# Patient Record
Sex: FEMALE | Race: WHITE | Employment: OTHER | URBAN - METROPOLITAN AREA
[De-identification: names, ages, dates, MRNs, and addresses within clinical notes are randomized per-mention and may not be internally consistent; named-entity substitution may affect disease eponyms.]

---

## 2024-05-14 ENCOUNTER — OFFICE VISIT (OUTPATIENT)
Dept: ORTHOPEDIC SURGERY | Age: 49
End: 2024-05-14
Payer: COMMERCIAL

## 2024-05-14 ENCOUNTER — TELEPHONE (OUTPATIENT)
Dept: ORTHOPEDIC SURGERY | Age: 49
End: 2024-05-14

## 2024-05-14 ENCOUNTER — PREP FOR PROCEDURE (OUTPATIENT)
Dept: ORTHOPEDIC SURGERY | Age: 49
End: 2024-05-14

## 2024-05-14 VITALS — TEMPERATURE: 97.9 F | HEART RATE: 78 BPM | OXYGEN SATURATION: 99 % | RESPIRATION RATE: 18 BRPM

## 2024-05-14 DIAGNOSIS — Z01.818 PREOPERATIVE TESTING: ICD-10-CM

## 2024-05-14 DIAGNOSIS — G56.03 BILATERAL CARPAL TUNNEL SYNDROME: Primary | ICD-10-CM

## 2024-05-14 DIAGNOSIS — M65.311 TRIGGER THUMB OF RIGHT HAND: ICD-10-CM

## 2024-05-14 DIAGNOSIS — M65.312 TRIGGER THUMB OF LEFT HAND: ICD-10-CM

## 2024-05-14 DIAGNOSIS — G56.02 LEFT CARPAL TUNNEL SYNDROME: ICD-10-CM

## 2024-05-14 DIAGNOSIS — G56.01 CARPAL TUNNEL SYNDROME, RIGHT: ICD-10-CM

## 2024-05-14 PROCEDURE — 99203 OFFICE O/P NEW LOW 30 MIN: CPT | Performed by: ORTHOPAEDIC SURGERY

## 2024-05-14 ASSESSMENT — ENCOUNTER SYMPTOMS
EYE PAIN: 0
COLOR CHANGE: 0
EYE REDNESS: 0
SHORTNESS OF BREATH: 0
CHEST TIGHTNESS: 0
VOMITING: 0
WHEEZING: 0

## 2024-05-14 NOTE — PATIENT INSTRUCTIONS
Continue weight-bearing as tolerated.  Continue range of motion exercises as instructed.  Ice and elevate as needed.  Tylenol or Motrin for pain.  We will schedule surgery at your convenience. If you have any questions regarding your surgery, please call our office and ask to speak with Caitlin [phone].

## 2024-05-14 NOTE — TELEPHONE ENCOUNTER
Scheduled patient for;    Left Carpal Tunnel Release and Left Thumb Trigger Finger Release  CPT: 07983; 33585  ICD 10: G56.02; M65.312  Surgery Date: 5/22/2024  Surgeon: Tre  Facility: [facility]  Anesthesia: MAC    Clearances sent to:  PCP: Apoorva Howard  Nephrology: Gal    Insurance: Aetna  Prior auth started on 5/14/2024 via [employer] online portal  No Prior Auth Required

## 2024-05-14 NOTE — PROGRESS NOTES
Patient seen in office today for: Left trigger thumb started 4 weeks, pain is located in left thumb to palm with tingling and numbness.     Patient reports 10/10 pain, especially at night   RICE and medication are effective to alleviate pain and reduce swelling.     Pain worsened by: Patient reports painful ROM & weight bearing.     Patient is interested in injection today, but open to surgical intervention.     Profession: Disabled     Right handed   
nerve distribution.  Sensation is intact to light touch along the ulnar and radial nerves.  Positive carpal compression test and Phalen's test.  There is mild relative weakness with 4+ strength out of 5 during  test, pinch test, and flexor pollicis brevis.    Skin is intact without wounds or rash.  2+ radial pulse with brisk cap refill throughout the fingers.  No atrophy of the thenar musculature. Strength 5/5 in finger and wrist flexion and extension.  Well-healed surgical scar over the ring finger metacarpal from previous trigger release    There is severe tenderness to palpation over the A1 pulley of the thumb.  There is mild swelling diffusely throughout the digit primarily along the course of the flexor tendon.  There is active triggering with range of motion of the thumb IP joint with very limited range of motion.  The thumb is held in an extended position and is unable to be flexed actively or passively due to pain and guarding.  There is pain at the volar aspect of the thumb with passive stretch extension across the IP joint of the thumb.      Right Upper Extremity:    There is moderate to severe tenderness to palpation over the A1 pulley of the thumb.  There is mild swelling diffusely throughout the digit primarily along the course of the flexor tendon.  There is active triggering with range of motion of the thumb IP joint.  There is pain at the volar aspect of the thumb with passive stretch extension across the IP joint of the thumb.  Active range of motion is mildly limited both in flexion and extension across the IP joint.  Sensation is intact to light touch throughout the hand in the median, ulnar, radial nerve distributions with mild decreased sensation along the thumb index and middle finger to light touch.  Mildly positive carpal compression test.  Skin is intact.  2+ radial pulse. Strength is 5/5 with thumb and finger flexion and extension of IP and MP joints throughout the hand         Skin:

## 2024-05-17 RX ORDER — MIRTAZAPINE 15 MG/1
TABLET, FILM COATED ORAL
COMMUNITY

## 2024-05-17 RX ORDER — SODIUM CHLORIDE 0.9 % (FLUSH) 0.9 %
5-40 SYRINGE (ML) INJECTION EVERY 12 HOURS SCHEDULED
Status: CANCELLED | OUTPATIENT
Start: 2024-05-17

## 2024-05-17 RX ORDER — GABAPENTIN 400 MG/1
CAPSULE ORAL
COMMUNITY

## 2024-05-17 RX ORDER — ESCITALOPRAM OXALATE 10 MG/1
TABLET ORAL
COMMUNITY

## 2024-05-17 RX ORDER — INSULIN HUMAN 500 [IU]/ML
INJECTION, SOLUTION SUBCUTANEOUS
COMMUNITY

## 2024-05-17 RX ORDER — DESVENLAFAXINE 100 MG/1
100 TABLET, EXTENDED RELEASE ORAL DAILY
COMMUNITY
Start: 2024-04-19

## 2024-05-17 RX ORDER — SODIUM CHLORIDE 9 MG/ML
INJECTION, SOLUTION INTRAVENOUS PRN
Status: CANCELLED | OUTPATIENT
Start: 2024-05-17

## 2024-05-17 RX ORDER — ONDANSETRON 4 MG/1
TABLET, FILM COATED ORAL
COMMUNITY
Start: 2024-05-03

## 2024-05-17 RX ORDER — SUMATRIPTAN 100 MG/1
TABLET, FILM COATED ORAL
COMMUNITY

## 2024-05-17 RX ORDER — CARVEDILOL 6.25 MG/1
6.25 TABLET ORAL 2 TIMES DAILY
COMMUNITY

## 2024-05-17 RX ORDER — FENOFIBRATE 200 MG/1
CAPSULE ORAL
COMMUNITY

## 2024-05-17 RX ORDER — OXCARBAZEPINE 300 MG/1
TABLET, FILM COATED ORAL
COMMUNITY

## 2024-05-17 RX ORDER — PRAZOSIN HYDROCHLORIDE 1 MG/1
1 CAPSULE ORAL NIGHTLY
COMMUNITY

## 2024-05-17 RX ORDER — OXYBUTYNIN CHLORIDE 10 MG/1
TABLET, EXTENDED RELEASE ORAL DAILY
COMMUNITY

## 2024-05-17 RX ORDER — POTASSIUM CITRATE 10 MEQ/1
10 TABLET, EXTENDED RELEASE ORAL 2 TIMES DAILY
COMMUNITY

## 2024-05-17 RX ORDER — IPRATROPIUM BROMIDE AND ALBUTEROL SULFATE 2.5; .5 MG/3ML; MG/3ML
3 SOLUTION RESPIRATORY (INHALATION)
COMMUNITY

## 2024-05-17 RX ORDER — ATORVASTATIN CALCIUM 20 MG/1
40 TABLET, FILM COATED ORAL DAILY
COMMUNITY

## 2024-05-17 RX ORDER — SODIUM CHLORIDE 0.9 % (FLUSH) 0.9 %
5-40 SYRINGE (ML) INJECTION PRN
Status: CANCELLED | OUTPATIENT
Start: 2024-05-17

## 2024-05-17 RX ORDER — OMEPRAZOLE 40 MG/1
40 CAPSULE, DELAYED RELEASE ORAL DAILY
COMMUNITY

## 2024-05-17 RX ORDER — DICYCLOMINE HCL 20 MG
20 TABLET ORAL EVERY 6 HOURS
COMMUNITY

## 2024-05-17 RX ORDER — HYDROXYZINE PAMOATE 50 MG/1
100 CAPSULE ORAL 3 TIMES DAILY PRN
COMMUNITY

## 2024-05-17 RX ORDER — FUROSEMIDE 20 MG/1
TABLET ORAL
COMMUNITY

## 2024-05-17 RX ORDER — GLIPIZIDE 10 MG/1
TABLET, FILM COATED, EXTENDED RELEASE ORAL
COMMUNITY
Start: 2024-04-19

## 2024-05-17 RX ORDER — INSULIN GLARGINE AND LIXISENATIDE 100; 33 U/ML; UG/ML
32 INJECTION, SOLUTION SUBCUTANEOUS
COMMUNITY
Start: 2024-04-04

## 2024-05-17 RX ORDER — EMPAGLIFLOZIN 25 MG/1
TABLET, FILM COATED ORAL
COMMUNITY

## 2024-05-17 RX ORDER — CLONAZEPAM 1 MG/1
TABLET ORAL
COMMUNITY

## 2024-05-17 NOTE — PROGRESS NOTES
.Surgery @ [facility] on 5/22/24 you will be called 5/21/24 with times    NOTHING TO EAT OR DRINK AFTER MIDNIGHT DAY OF SURGERY    1. Enter thru the hospital main entrance on day of surgery, check in at the Information Desk. If you arrive prior to 6:00am, enter thru the ER entrance.    2. Follow the directions as prescribed by the doctor for your procedure and medications.         Morning of surgery take: Coreg, Omeprazole & Bentyl with sips of water         Stop vitamins, supplements and NSAIDS: NOW         Hold Jardiance & Glipizide starting 5/21/24    3. Check with your Doctor regarding stopping blood thinners and follow their instructions.    4. Do not smoke, vape or use chewing tobacco morning of surgery. Do not drink any alcoholic beverages 24 hours prior to surgery.       This includes NA Beer. No street drugs 7 days prior to surgery.    5. If you have dentures, contacts of glasses they will be removed before going to the OR; please bring a case.    6. Please bring picture ID, insurance card, paperwork from the doctor’s office (H & P, Consent, & card for implantable devices).    7. Take a shower with an antibacterial soap the night before surgery and the morning of surgery. Do not put anything on your skin      After your morning shower.    8. You will need a responsible adult to drive you home and check on you after surgery.

## 2024-05-21 ENCOUNTER — ANESTHESIA EVENT (OUTPATIENT)
Dept: OPERATING ROOM | Age: 49
End: 2024-05-21
Payer: COMMERCIAL

## 2024-05-21 ASSESSMENT — LIFESTYLE VARIABLES: SMOKING_STATUS: 1

## 2024-05-21 NOTE — ANESTHESIA PRE PROCEDURE
\"HEPCAB\"    COVID-19 Screening (If Applicable): No results found for: \"COVID19\"        Anesthesia Evaluation  Patient summary reviewed  Airway: Mallampati: III  TM distance: >3 FB   Neck ROM: full  Mouth opening: > = 3 FB   Dental: normal exam         Pulmonary:   (+)       decreased breath sounds    asthma: current smoker                           Cardiovascular:                      Neuro/Psych:   (+) psychiatric history:            GI/Hepatic/Renal:   (+) GERD:, morbid obesity          Endo/Other:    (+) DiabetesType II DM, : arthritis:..                 Abdominal:   (+) obese          Vascular:          Other Findings:       Anesthesia Plan      MAC     ASA 3       Induction: intravenous.      Anesthetic plan and risks discussed with patient.      Plan discussed with CRNA.                DENISE Dallas - CRNA   5/21/2024

## 2024-05-21 NOTE — PROGRESS NOTES
Patient notified of surgery time at [facility] 5/22/2024 0730 arrival 0600, NPO instructions and DOS meds reviewed, understanding verbalized

## 2024-05-22 ENCOUNTER — ANESTHESIA (OUTPATIENT)
Dept: OPERATING ROOM | Age: 49
End: 2024-05-22
Payer: COMMERCIAL

## 2024-05-22 ENCOUNTER — HOSPITAL ENCOUNTER (OUTPATIENT)
Age: 49
Setting detail: OUTPATIENT SURGERY
Discharge: HOME | End: 2024-05-22
Attending: ORTHOPAEDIC SURGERY | Admitting: ORTHOPAEDIC SURGERY
Payer: COMMERCIAL

## 2024-05-22 VITALS
BODY MASS INDEX: 43.32 KG/M2 | TEMPERATURE: 97.9 F | HEART RATE: 99 BPM | WEIGHT: 276 LBS | RESPIRATION RATE: 18 BRPM | SYSTOLIC BLOOD PRESSURE: 127 MMHG | OXYGEN SATURATION: 93 % | DIASTOLIC BLOOD PRESSURE: 82 MMHG | HEIGHT: 67 IN

## 2024-05-22 DIAGNOSIS — G56.02 LEFT CARPAL TUNNEL SYNDROME: Primary | ICD-10-CM

## 2024-05-22 LAB
ALBUMIN SERPL-MCNC: 4.8 GM/DL (ref 3.4–5)
ALP BLD-CCNC: 67 IU/L (ref 40–129)
ALT SERPL-CCNC: 38 U/L (ref 10–40)
ANION GAP SERPL CALCULATED.3IONS-SCNC: 17 MMOL/L (ref 7–16)
AST SERPL-CCNC: 27 IU/L (ref 15–37)
BACTERIA: ABNORMAL /HPF
BASOPHILS ABSOLUTE: 0.1 K/CU MM
BASOPHILS RELATIVE PERCENT: 1.1 % (ref 0–1)
BILIRUB SERPL-MCNC: 0.5 MG/DL (ref 0–1)
BILIRUBIN, URINE: NEGATIVE MG/DL
BLOOD, URINE: ABNORMAL
BUN SERPL-MCNC: 35 MG/DL (ref 6–23)
CALCIUM SERPL-MCNC: 9.8 MG/DL (ref 8.3–10.6)
CHLORIDE BLD-SCNC: 95 MMOL/L (ref 99–110)
CLARITY: CLEAR
CO2: 23 MMOL/L (ref 21–32)
COLOR: YELLOW
CREAT SERPL-MCNC: 1.9 MG/DL (ref 0.6–1.1)
DIFFERENTIAL TYPE: ABNORMAL
EOSINOPHILS ABSOLUTE: 0.1 K/CU MM
EOSINOPHILS RELATIVE PERCENT: 1.3 % (ref 0–3)
GFR, ESTIMATED: 33 ML/MIN/1.73M2
GLUCOSE BLD-MCNC: 328 MG/DL (ref 70–99)
GLUCOSE SERPL-MCNC: 309 MG/DL (ref 70–99)
GLUCOSE URINE: >1000 MG/DL
HCT VFR BLD CALC: 52.3 % (ref 37–47)
HEMOGLOBIN: 16.9 GM/DL (ref 12.5–16)
IMMATURE NEUTROPHIL %: 0.3 % (ref 0–0.43)
KETONES, URINE: NEGATIVE MG/DL
LEUKOCYTE ESTERASE, URINE: ABNORMAL
LYMPHOCYTES ABSOLUTE: 3.3 K/CU MM
LYMPHOCYTES RELATIVE PERCENT: 43.6 % (ref 24–44)
MCH RBC QN AUTO: 26.9 PG (ref 27–31)
MCHC RBC AUTO-ENTMCNC: 32.3 % (ref 32–36)
MCV RBC AUTO: 83.1 FL (ref 78–100)
MONOCYTES ABSOLUTE: 0.4 K/CU MM
MONOCYTES RELATIVE PERCENT: 5.2 % (ref 0–4)
MUCUS: ABNORMAL HPF
NEUTROPHILS ABSOLUTE: 3.6 K/CU MM
NEUTROPHILS RELATIVE PERCENT: 48.5 % (ref 36–66)
NITRITE URINE, QUANTITATIVE: NEGATIVE
NUCLEATED RBC %: 0 %
PDW BLD-RTO: 14.5 % (ref 11.7–14.9)
PH, URINE: 6 (ref 5–8)
PLATELET # BLD: 233 K/CU MM (ref 140–440)
PMV BLD AUTO: 10.9 FL (ref 7.5–11.1)
POTASSIUM SERPL-SCNC: 4.1 MMOL/L (ref 3.5–5.1)
PROTEIN UA: NEGATIVE MG/DL
RBC # BLD: 6.29 M/CU MM (ref 4.2–5.4)
RBC URINE: 19 /HPF (ref 0–6)
SODIUM BLD-SCNC: 135 MMOL/L (ref 135–145)
SPECIFIC GRAVITY UA: 1.01 (ref 1–1.03)
SQUAMOUS EPITHELIAL: 1 /HPF
TOTAL IMMATURE NEUTOROPHIL: 0.02 K/CU MM
TOTAL NUCLEATED RBC: 0 K/CU MM
TOTAL PROTEIN: 7.6 GM/DL (ref 6.4–8.2)
TRANSITIONAL EPITHELIAL: <1 /HPF
TRICHOMONAS: ABNORMAL /HPF
UROBILINOGEN, URINE: 0.2 MG/DL (ref 0.2–1)
WBC # BLD: 7.5 K/CU MM (ref 4–10.5)
WBC UA: 18 /HPF (ref 0–5)

## 2024-05-22 PROCEDURE — 82962 GLUCOSE BLOOD TEST: CPT

## 2024-05-22 PROCEDURE — 26055 INCISE FINGER TENDON SHEATH: CPT | Performed by: ORTHOPAEDIC SURGERY

## 2024-05-22 PROCEDURE — 6370000000 HC RX 637 (ALT 250 FOR IP): Performed by: ANESTHESIOLOGY

## 2024-05-22 PROCEDURE — 3600000012 HC SURGERY LEVEL 2 ADDTL 15MIN: Performed by: ORTHOPAEDIC SURGERY

## 2024-05-22 PROCEDURE — 2580000003 HC RX 258: Performed by: ORTHOPAEDIC SURGERY

## 2024-05-22 PROCEDURE — 3600000002 HC SURGERY LEVEL 2 BASE: Performed by: ORTHOPAEDIC SURGERY

## 2024-05-22 PROCEDURE — 64721 CARPAL TUNNEL SURGERY: CPT | Performed by: ORTHOPAEDIC SURGERY

## 2024-05-22 PROCEDURE — 3700000001 HC ADD 15 MINUTES (ANESTHESIA): Performed by: ORTHOPAEDIC SURGERY

## 2024-05-22 PROCEDURE — 2500000003 HC RX 250 WO HCPCS: Performed by: ORTHOPAEDIC SURGERY

## 2024-05-22 PROCEDURE — 85025 COMPLETE CBC W/AUTO DIFF WBC: CPT

## 2024-05-22 PROCEDURE — 81001 URINALYSIS AUTO W/SCOPE: CPT

## 2024-05-22 PROCEDURE — 6360000002 HC RX W HCPCS: Performed by: REGISTERED NURSE

## 2024-05-22 PROCEDURE — 80053 COMPREHEN METABOLIC PANEL: CPT

## 2024-05-22 PROCEDURE — 3700000000 HC ANESTHESIA ATTENDED CARE: Performed by: ORTHOPAEDIC SURGERY

## 2024-05-22 PROCEDURE — 2709999900 HC NON-CHARGEABLE SUPPLY: Performed by: ORTHOPAEDIC SURGERY

## 2024-05-22 PROCEDURE — 7100000010 HC PHASE II RECOVERY - FIRST 15 MIN: Performed by: ORTHOPAEDIC SURGERY

## 2024-05-22 PROCEDURE — 6360000002 HC RX W HCPCS: Performed by: ORTHOPAEDIC SURGERY

## 2024-05-22 PROCEDURE — 7100000011 HC PHASE II RECOVERY - ADDTL 15 MIN: Performed by: ORTHOPAEDIC SURGERY

## 2024-05-22 RX ORDER — SODIUM CHLORIDE 0.9 % (FLUSH) 0.9 %
5-40 SYRINGE (ML) INJECTION EVERY 12 HOURS SCHEDULED
Status: DISCONTINUED | OUTPATIENT
Start: 2024-05-22 | End: 2024-05-22 | Stop reason: HOSPADM

## 2024-05-22 RX ORDER — MIDAZOLAM HYDROCHLORIDE 1 MG/ML
INJECTION INTRAMUSCULAR; INTRAVENOUS PRN
Status: DISCONTINUED | OUTPATIENT
Start: 2024-05-22 | End: 2024-05-22 | Stop reason: SDUPTHER

## 2024-05-22 RX ORDER — BUPIVACAINE HYDROCHLORIDE 5 MG/ML
INJECTION, SOLUTION EPIDURAL; INTRACAUDAL
Status: COMPLETED | OUTPATIENT
Start: 2024-05-22 | End: 2024-05-22

## 2024-05-22 RX ORDER — HYDROCODONE BITARTRATE AND ACETAMINOPHEN 5; 325 MG/1; MG/1
1 TABLET ORAL EVERY 6 HOURS PRN
Qty: 12 TABLET | Refills: 0 | Status: SHIPPED | OUTPATIENT
Start: 2024-05-22 | End: 2024-05-25

## 2024-05-22 RX ORDER — SODIUM CHLORIDE 9 MG/ML
INJECTION, SOLUTION INTRAVENOUS PRN
Status: DISCONTINUED | OUTPATIENT
Start: 2024-05-22 | End: 2024-05-22 | Stop reason: HOSPADM

## 2024-05-22 RX ORDER — BUPIVACAINE HYDROCHLORIDE AND EPINEPHRINE 2.5; 5 MG/ML; UG/ML
INJECTION, SOLUTION EPIDURAL; INFILTRATION; INTRACAUDAL; PERINEURAL
Status: COMPLETED | OUTPATIENT
Start: 2024-05-22 | End: 2024-05-22

## 2024-05-22 RX ORDER — SODIUM CHLORIDE 0.9 % (FLUSH) 0.9 %
5-40 SYRINGE (ML) INJECTION PRN
Status: DISCONTINUED | OUTPATIENT
Start: 2024-05-22 | End: 2024-05-22 | Stop reason: HOSPADM

## 2024-05-22 RX ORDER — LIDOCAINE HYDROCHLORIDE 10 MG/ML
INJECTION, SOLUTION EPIDURAL; INFILTRATION; INTRACAUDAL; PERINEURAL
Status: COMPLETED | OUTPATIENT
Start: 2024-05-22 | End: 2024-05-22

## 2024-05-22 RX ORDER — PROPOFOL 10 MG/ML
INJECTION, EMULSION INTRAVENOUS PRN
Status: DISCONTINUED | OUTPATIENT
Start: 2024-05-22 | End: 2024-05-22 | Stop reason: SDUPTHER

## 2024-05-22 RX ORDER — FENTANYL CITRATE 50 UG/ML
INJECTION, SOLUTION INTRAMUSCULAR; INTRAVENOUS PRN
Status: DISCONTINUED | OUTPATIENT
Start: 2024-05-22 | End: 2024-05-22 | Stop reason: SDUPTHER

## 2024-05-22 RX ORDER — CEFAZOLIN SODIUM 1 G/3ML
INJECTION, POWDER, FOR SOLUTION INTRAMUSCULAR; INTRAVENOUS PRN
Status: DISCONTINUED | OUTPATIENT
Start: 2024-05-22 | End: 2024-05-22 | Stop reason: SDUPTHER

## 2024-05-22 RX ADMIN — FENTANYL CITRATE 25 MCG: 50 INJECTION, SOLUTION INTRAMUSCULAR; INTRAVENOUS at 07:52

## 2024-05-22 RX ADMIN — MIDAZOLAM 2 MG: 1 INJECTION INTRAMUSCULAR; INTRAVENOUS at 07:32

## 2024-05-22 RX ADMIN — SODIUM CHLORIDE: 9 INJECTION, SOLUTION INTRAVENOUS at 06:54

## 2024-05-22 RX ADMIN — CEFAZOLIN 3 G: 1 INJECTION, POWDER, FOR SOLUTION INTRAMUSCULAR; INTRAVENOUS; PARENTERAL at 07:38

## 2024-05-22 RX ADMIN — PROPOFOL 250 MG: 10 INJECTION, EMULSION INTRAVENOUS at 07:41

## 2024-05-22 RX ADMIN — FENTANYL CITRATE 50 MCG: 50 INJECTION, SOLUTION INTRAMUSCULAR; INTRAVENOUS at 07:46

## 2024-05-22 RX ADMIN — FENTANYL CITRATE 25 MCG: 50 INJECTION, SOLUTION INTRAMUSCULAR; INTRAVENOUS at 07:38

## 2024-05-22 RX ADMIN — INSULIN HUMAN 6 UNITS: 100 INJECTION, SOLUTION PARENTERAL at 07:27

## 2024-05-22 RX ADMIN — PROPOFOL 100 MG: 10 INJECTION, EMULSION INTRAVENOUS at 07:40

## 2024-05-22 ASSESSMENT — PAIN SCALES - GENERAL
PAINLEVEL_OUTOF10: 0
PAINLEVEL_OUTOF10: 0
PAINLEVEL_OUTOF10: 9

## 2024-05-22 ASSESSMENT — PAIN DESCRIPTION - ORIENTATION: ORIENTATION: RIGHT;LEFT

## 2024-05-22 ASSESSMENT — PAIN - FUNCTIONAL ASSESSMENT: PAIN_FUNCTIONAL_ASSESSMENT: ACTIVITIES ARE NOT PREVENTED

## 2024-05-22 ASSESSMENT — PAIN DESCRIPTION - LOCATION: LOCATION: HAND

## 2024-05-22 ASSESSMENT — PAIN DESCRIPTION - DESCRIPTORS: DESCRIPTORS: STABBING

## 2024-05-22 ASSESSMENT — PAIN DESCRIPTION - ONSET: ONSET: ON-GOING

## 2024-05-22 ASSESSMENT — PAIN DESCRIPTION - FREQUENCY: FREQUENCY: CONTINUOUS

## 2024-05-22 ASSESSMENT — PAIN DESCRIPTION - PAIN TYPE: TYPE: CHRONIC PAIN

## 2024-05-22 NOTE — H&P
Chief Complaint   Patient presents with    Consultation    Trigger finger       Left thumb   Also left hand numbness     History of Present Illness:                             Janessa Elise is a 48 y.o. female who presents today for evaluation of her bilateral thumb pain stiffness and catching.  Symptoms have been getting progressively worse over the last 1 to 2 months.  She has severe stiffness in the left thumb and inability to flex or movement.  She has severe pain along the volar aspect of her left thumb at the MP joint.  She has had difficulty with gripping and has severe limitations in her daily activities.  Pain is constant throughout the day and 10 out of 10 with any attempted movement or pressure on the thumb.     Symptoms are less severe on the right but she does have painful clicking and discomfort along the volar aspect of her MP joint of the right thumb as well.     She also has intermittent history of numbness and tingling in bilateral hands which seems to be progressively worsening over the last several years.  She now has numbness and tingling into her fingertips mostly along the index middle and ring fingers.     Patient has a history of previous trigger finger in the left hand that failed conservative measures.  She underwent left ring finger trigger finger release about 15 years ago.  She healed well from this procedure and is very pleased with her recovery.        Patient seen in office today for: Left trigger thumb started 4 weeks, pain is located in left thumb to palm with tingling and numbness. Hx of left arm       Patient reports 10/10 pain, especially at night   RICE and medication are effective to alleviate pain and reduce swelling.      Pain worsened by: Patient reports painful ROM & weight bearing.      Patient is interested in injection today, but open to surgical intervention.      Profession: Disabled      Right handed         Medical History  Patient's medications, allergies, past

## 2024-05-22 NOTE — OP NOTE
Operative Note      Patient: Janessa Elise  YOB: 1975  MRN: [id number]    Date of Procedure: 5/22/2024    Pre-Op Diagnosis Codes:     * Left carpal tunnel syndrome [G56.02]     * Trigger thumb of left hand [M65.312]    Post-Op Diagnosis: Same         Procedure:  1.  Left carpal tunnel release    2.  Left trigger thumb release    Surgeon(s):  Osmar Toledo MD    Assistant:   Physician Assistant: Phil Godoy PA-C Greg Mann was present for the entirety of the procedure and vital for the performance of the procedure. Grady Godoy assisted with positioning, prepping, draping of the patient before the procedure and instrument manipulation, extremity repositioning during the procedure as well as wound closure, dressing application and brace application after the procedure. Please note that no intern, resident, or other hospital staff was available to assist during the surgery      Anesthesia: Monitor Anesthesia Care    Estimated Blood Loss (mL): Minimal    Complications: None    Specimens:   * No specimens in log *    Implants:  * No implants in log *      Drains: * No LDAs found *    Findings:  Infection Present At Time Of Surgery (PATOS) (choose all levels that have infection present):  No infection present  Other Findings:     Detailed Description of Procedure:        DESCRIPTION OF PROCEDURE:  The patient was identified in the  preoperative area and the site was marked.  The patient was taken back to the  operating room and a hand table was attached to the cart.  The left  upper extremity was prepped with alcohol and then the surgical site was  injected with a mixture of 5 mL of 1% plain lidocaine and 5 mL of 0.5%  Marcaine with epinephrine.  The left upper extremity was then prepped  and draped in sterile fashion with an arm tourniquet.  Timeout was  performed.  Preoperative antibiotics were given.  The arm was  exsanguinated with an Esmarch and tourniquet inflated to 250 mmHg and  deflated at the

## 2024-05-22 NOTE — PROGRESS NOTES
0823 - received patient from or, report received from Angelina GILES and Noy MALAGON, patient A&O, denies pain or nausea, given diet starry and crackers, family at bedside, call light within reach, dressing dry, patient can move fingers on left hand, capillary refill less than 3 seconds  0855 - VSS, denies pain or nausea  0858 - patient ambulated to bathroom, voided  0905 - discharge instructions given to patient and mother, Alwine, understanding voiced without any further questions at this time.  0907 - iv removed  0910 - patient dressing  0919 - patient left sds via wheelchair accompanied by pca

## 2024-05-22 NOTE — DISCHARGE INSTRUCTIONS
Carpal Tunnel Release  Discharge Instructions    Keep the bandage and ace wrap clean and dry for 2 days.  Remove the Ace wrap and all dressings 2 days after surgery.  It is okay to gently clean the hand with soap and water.  OK to gently clean the incision/stitches with moist gauze. Place a large band-aid or gauze bandage over the stitches and replace daily as needed.  It is okay to leave the incision open to air once there is no further drainage from the wound, but avoid dirt/contamination.    Reuse the ace wrap for comfort/protection as needed.    Move fingers regularly to prevent stiffness.    Use ice 20 minutes at a time every 1-2 hours as needed for pain relief.    It is okay to use the hand for light activities such as eating and getting dressed.  No heavy gripping or direct pressure on the healing incision.           [facility]  [phone]    Do not drive, work around machines or use equipment.  Do not drink any alcoholic beverages.  Do not smoke while alone.  Avoid making important decisions.  Plan to spend a quiet, relaxed evening @ home.  Resume normal activities as you begin to feel better.  Eat lightly for your first meal, then gradually increase your diet to what is normal for you.  In case of nausea, avoid food and drink only clear liquids.  Resume food as nausea ceases.  Notify your surgeon if you experience fever, chills, large amount of bleeding, difficulty breathing, persistent nausea and vomiting or any other disturbing problem.  Call for a follow-up appointment with your surgeon.

## 2024-05-30 ENCOUNTER — OFFICE VISIT (OUTPATIENT)
Dept: ORTHOPEDIC SURGERY | Age: 49
End: 2024-05-30

## 2024-05-30 VITALS — HEIGHT: 67 IN | WEIGHT: 276 LBS | BODY MASS INDEX: 43.32 KG/M2 | RESPIRATION RATE: 12 BRPM

## 2024-05-30 DIAGNOSIS — Z98.890 STATUS POST CARPAL TUNNEL RELEASE: Primary | ICD-10-CM

## 2024-05-30 NOTE — PATIENT INSTRUCTIONS
Weight bear as tolerated  Continue range of motion and exercises   Ice and elevate as needed  Tylenol or Motrin for pain  Sutures removed  Follow up in 4 weeks.

## 2024-06-15 ASSESSMENT — ENCOUNTER SYMPTOMS: BACK PAIN: 0

## 2024-06-15 NOTE — PROGRESS NOTES
and median nerve motor function intact.  Testing.  Radial pulse 2+, brisk capillary refill.  Sensation light touch intact throughout all surfaces of the left upper extremity with some lingering paresthesias at the volar fat pads of the median nerve distribution.  Patient notes the incision area of her left thumb base is also healing well.  There is no signs of infection from the thumb incision.  Patient able to perform flexion extension maneuvers of the left thumb without clicking or catching.  There is tenderness at extreme flexion and extension of the thumb.     Neurological:      General: No focal deficit present.      Mental Status: She is alert and oriented to person, place, and time.   Psychiatric:         Mood and Affect: Mood normal.         Behavior: Behavior normal.        Diagnostic testing:  X-rays reviewed in office, I independently reviewed the films in the office today:     No new images at today's visit    Office Procedures:  No orders of the defined types were placed in this encounter.      Assessment and Plan  1.  Status post left trigger thumb release and carpal tunnel release    Weight bear as tolerated  Continue range of motion and exercises   Ice and elevate as needed  Tylenol or Motrin for pain  Sutures removed  Follow up in 4 weeks for recheck of her progress.  If she is struggling in any way, we will likely refer her for formal physical therapy.        Electronically signed by Phil Godoy PA-C on 6/15/2024 at 8:09 AM

## 2024-06-27 ENCOUNTER — OFFICE VISIT (OUTPATIENT)
Dept: ORTHOPEDIC SURGERY | Age: 49
End: 2024-06-27
Payer: COMMERCIAL

## 2024-06-27 VITALS — TEMPERATURE: 97.2 F | OXYGEN SATURATION: 96 % | HEART RATE: 85 BPM

## 2024-06-27 DIAGNOSIS — M65.311 TRIGGER THUMB OF RIGHT HAND: ICD-10-CM

## 2024-06-27 DIAGNOSIS — G56.01 RIGHT CARPAL TUNNEL SYNDROME: Primary | ICD-10-CM

## 2024-06-27 PROCEDURE — 99214 OFFICE O/P EST MOD 30 MIN: CPT | Performed by: ORTHOPAEDIC SURGERY

## 2024-06-27 ASSESSMENT — ENCOUNTER SYMPTOMS
EYE PAIN: 0
SHORTNESS OF BREATH: 0
CHEST TIGHTNESS: 0
EYE REDNESS: 0
COLOR CHANGE: 0
WHEEZING: 0
VOMITING: 0

## 2024-06-27 NOTE — PROGRESS NOTES
6/27/2024   Chief Complaint   Patient presents with    Wrist Pain     6 wk post op left wrist CTR and Lt thumb TFR   Worsening right hand pain numbness and tingling and triggering of the thumb    History of Present Illness:                             Janessa Elise is a 48 y.o. female who returns today for follow-up of her left hand following previous carpal tunnel release and trigger thumb release as well as evaluation of her worsening symptoms now on the right side.    She has noticed good improvement with her left hand and is very pleased with her healing process.  She has no triggering at the thumb and her sensation is improved in the hand.  She has noticed worsening symptoms now on her right side and feels that her right side is more severe than the left.\          She would like to discuss moving forward now with surgery on her more symptomatic right side    Patient seen in office today for:6 wk post op Lt CTR/ Lt thumb TFR  ROM is pretty good sometimes pain radiates to her shoulder or vs she's not sure   She is doing very well      DOS:5/30/24        Patient reports 0/10 pain on the left, 6 out of 10 pain on the right.  RICE and medication are effective to alleviate pain and reduce swelling.   Pain also alleviated by: OTC med at times   Pain worsened by: Patient reports painful ROM & weight bearing.         Patient interested in speaking to provider about surgical option.for her RT to be done also Possible trigger starting would like to get surgery over with      Profession: disabled     Right handed          Medical History  Patient's medications, allergies, past medical, surgical, social and family histories were reviewed and updated as appropriate.    Past Medical History:   Diagnosis Date    Asthma     Bipolar 1 disorder (HCC)     Depression with anxiety     Diabetes mellitus (HCC)     GERD (gastroesophageal reflux disease)     Hyperlipidemia     Hypertension     Kidney disease     Stage 3

## 2024-06-27 NOTE — PATIENT INSTRUCTIONS
Continue weight-bearing as tolerated.  Continue range of motion exercises as instructed.  Ice and elevate as needed.  Tylenol or Motrin for pain.  We will schedule surgery at your convenience. If you have any questions regarding your surgery, please call our office and ask to speak with Caitlin [phone].

## 2024-06-27 NOTE — PROGRESS NOTES
Patient seen in office today for:6 wk post op Lt CTR/ Lt thumb TFR  ROM is pretty good sometimes pain radiates to her shoulder or vs she's not sure   She is doing very well     DOS:5/30/24      Patient reports 0/10 pain.  RICE and medication are effective to alleviate pain and reduce swelling.   Pain also alleviated by: OTC med at times   Pain worsened by: Patient reports painful ROM & weight bearing.       Patient interested in speaking to provider about surgical option.for her RT to be done also Possible trigger starting would like to get surgery over with     Profession: disabled    Right handed

## 2024-07-01 ENCOUNTER — TELEPHONE (OUTPATIENT)
Dept: ORTHOPEDIC SURGERY | Age: 49
End: 2024-07-01

## 2024-07-01 PROBLEM — G56.01 CARPAL TUNNEL SYNDROME, RIGHT: Status: ACTIVE | Noted: 2024-05-14

## 2024-07-01 RX ORDER — SODIUM CHLORIDE 9 MG/ML
INJECTION, SOLUTION INTRAVENOUS PRN
OUTPATIENT
Start: 2024-07-01

## 2024-07-01 RX ORDER — SODIUM CHLORIDE 0.9 % (FLUSH) 0.9 %
5-40 SYRINGE (ML) INJECTION PRN
OUTPATIENT
Start: 2024-07-01

## 2024-07-01 RX ORDER — SODIUM CHLORIDE 0.9 % (FLUSH) 0.9 %
5-40 SYRINGE (ML) INJECTION EVERY 12 HOURS SCHEDULED
OUTPATIENT
Start: 2024-07-01

## 2024-07-01 NOTE — TELEPHONE ENCOUNTER
Scheduled patient for:    Right Carpal Tunnel Release/Right Thumb Trigger Finger Release  CPT: 29689; 57960  ICD 10: G56.01; M65.311  Surgery Date: 7/15/2024  Surgeon: Tre  Facility: [facility]  Anesthesia: MAC    Clearances sent to:  PCP: Apoorva Howard N.P  Nephrology: Eileen    Insurance: [employer]na [employer] and Medicaid  Prior auth started on 7/1/24 via [employer]  No Prior Auth is Required

## 2024-07-08 ENCOUNTER — INITIAL CONSULT (OUTPATIENT)
Dept: OBGYN | Age: 49
End: 2024-07-08
Payer: COMMERCIAL

## 2024-07-08 ENCOUNTER — HOSPITAL ENCOUNTER (OUTPATIENT)
Age: 49
Setting detail: SPECIMEN
Discharge: HOME | End: 2024-07-08
Payer: COMMERCIAL

## 2024-07-08 VITALS
WEIGHT: 278 LBS | DIASTOLIC BLOOD PRESSURE: 71 MMHG | BODY MASS INDEX: 43.63 KG/M2 | SYSTOLIC BLOOD PRESSURE: 111 MMHG | HEIGHT: 67 IN

## 2024-07-08 DIAGNOSIS — Z01.419 WELL WOMAN EXAM WITH ROUTINE GYNECOLOGICAL EXAM: Primary | ICD-10-CM

## 2024-07-08 DIAGNOSIS — Z80.41 FAMILY HISTORY OF OVARIAN CANCER: ICD-10-CM

## 2024-07-08 DIAGNOSIS — N91.2 AMENORRHEA: ICD-10-CM

## 2024-07-08 PROCEDURE — 99386 PREV VISIT NEW AGE 40-64: CPT | Performed by: OBSTETRICS & GYNECOLOGY

## 2024-07-08 PROCEDURE — 36415 COLL VENOUS BLD VENIPUNCTURE: CPT | Performed by: OBSTETRICS & GYNECOLOGY

## 2024-07-08 PROCEDURE — 87801 DETECT AGNT MULT DNA AMPLI: CPT

## 2024-07-08 PROCEDURE — 88142 CYTOPATH C/V THIN LAYER: CPT

## 2024-07-08 PROCEDURE — 87624 HPV HI-RISK TYP POOLED RSLT: CPT

## 2024-07-08 SDOH — ECONOMIC STABILITY: INCOME INSECURITY: HOW HARD IS IT FOR YOU TO PAY FOR THE VERY BASICS LIKE FOOD, HOUSING, MEDICAL CARE, AND HEATING?: NOT HARD AT ALL

## 2024-07-08 SDOH — ECONOMIC STABILITY: HOUSING INSECURITY
IN THE LAST 12 MONTHS, WAS THERE A TIME WHEN YOU DID NOT HAVE A STEADY PLACE TO SLEEP OR SLEPT IN A SHELTER (INCLUDING NOW)?: NO

## 2024-07-08 SDOH — ECONOMIC STABILITY: FOOD INSECURITY: WITHIN THE PAST 12 MONTHS, YOU WORRIED THAT YOUR FOOD WOULD RUN OUT BEFORE YOU GOT MONEY TO BUY MORE.: NEVER TRUE

## 2024-07-08 SDOH — ECONOMIC STABILITY: FOOD INSECURITY: WITHIN THE PAST 12 MONTHS, THE FOOD YOU BOUGHT JUST DIDN'T LAST AND YOU DIDN'T HAVE MONEY TO GET MORE.: NEVER TRUE

## 2024-07-08 ASSESSMENT — PATIENT HEALTH QUESTIONNAIRE - PHQ9
SUM OF ALL RESPONSES TO PHQ QUESTIONS 1-9: 0
2. FEELING DOWN, DEPRESSED OR HOPELESS: NOT AT ALL
1. LITTLE INTEREST OR PLEASURE IN DOING THINGS: NOT AT ALL
SUM OF ALL RESPONSES TO PHQ9 QUESTIONS 1 & 2: 0
SUM OF ALL RESPONSES TO PHQ QUESTIONS 1-9: 0

## 2024-07-08 NOTE — PROGRESS NOTES
.Surgery @ [facility] on 7/15/24 you will be called 7/12/24 with times    NOTHING TO EAT OR DRINK AFTER MIDNIGHT DAY OF SURGERY    1. Enter thru the hospital main entrance on day of surgery, check in at the Information Desk. If you arrive prior to 6:00am, enter thru the ER entrance.    2. Follow the directions as prescribed by the doctor for your procedure and medications.         Morning of surgery take: nebulizer treatment if needed, take Coreg, Klonopin, Gabapentin, Omeprazole, Trileptal with sips of water          Hold Soliqua INJ, Jardiance, Glipizide 7/14/2024         Stop vitamins, supplements and NSAIDS:  7/8/2024    3. Check with your Doctor regarding stopping blood thinners and follow their instructions.    4. Do not smoke, vape or use chewing tobacco morning of surgery. Do not drink any alcoholic beverages 24 hours prior to surgery.       This includes NA Beer. No street drugs 7 days prior to surgery.    5. If you have dentures, contacts of glasses they will be removed before going to the OR; please bring a case.    6. Please bring picture ID, insurance card, paperwork from the doctor’s office (H & P, Consent, & card for implantable devices).    7. Take a shower with an antibacterial soap the night before surgery and the morning of surgery. Do not put anything on your skin      After your morning shower.    8. You will need a responsible adult to drive you home and check on you after surgery.

## 2024-07-08 NOTE — PROGRESS NOTES
7/8/24    Janessa Elise  1975    Chief Complaint   Patient presents with    Gynecologic Exam     Pt here for consult due to Encounter for screening for malignant neoplasm of cervix. Postmenopausal. No hrt. Is not sexually active. Last pap smear-x 3 years ago hx of abnormal.  Has had tubal ligation.   Mammo- x years ago had u/s as well.   Pt requesting to discuss hysterectomy due to having family hx of ovarian cancer. Empowered offered.         Janessa Elise is a 48 y.o. female who presents today for evaluation of see above.    Past Medical History:   Diagnosis Date    Asthma     Bipolar 1 disorder (HCC)     Depression with anxiety     Diabetes mellitus (HCC)     GERD (gastroesophageal reflux disease)     Hyperlipidemia     Hypertension     Kidney disease     Stage 3    Neuropathy     Teeth missing        Past Surgical History:   Procedure Laterality Date    CARPAL TUNNEL RELEASE Left 5/22/2024    CARPAL TUNNEL RELEASE performed by Osmar Toledo MD at [facility] OR    CHOLECYSTECTOMY      COLONOSCOPY  2019    ESOPHAGOGASTRODUODENOSCOPY  05/16/2024    Normal exam per pt    FINGER TRIGGER RELEASE Left 2014    HAND SURGERY Left 5/22/2024    FINGER TRIGGER RELEASE/TENOSYNOVECTOMY performed by Osmar Toledo MD at [facility] OR    HERNIA REPAIR      LITHOTRIPSY      NEPHROSTOMY  08/25/2022    was septic    TUBAL LIGATION         Social History     Tobacco Use    Smoking status: Every Day     Types: Cigarettes    Smokeless tobacco: Never   Vaping Use    Vaping Use: Never used   Substance Use Topics    Alcohol use: Not Currently    Drug use: Not Currently       Family History   Problem Relation Age of Onset    Ovarian Cancer Maternal Grandmother     Diabetes Father     Heart Disease Father     Ovarian Cancer Mother     Ovarian Cancer Maternal Aunt     Ovarian Cancer Maternal Aunt        Current Outpatient Medications   Medication Sig Dispense Refill    ondansetron (ZOFRAN) 4 MG tablet TAKE 1 TABLET BY MOUTH 4 TIMES DAILY AS

## 2024-07-09 LAB — FSH SERPL-ACNC: 35.4 MIU/ML

## 2024-07-10 ENCOUNTER — TELEPHONE (OUTPATIENT)
Dept: ORTHOPEDIC SURGERY | Age: 49
End: 2024-07-10

## 2024-07-10 DIAGNOSIS — E11.9 TYPE 2 DIABETES MELLITUS WITHOUT COMPLICATION, UNSPECIFIED WHETHER LONG TERM INSULIN USE (HCC): Primary | ICD-10-CM

## 2024-07-10 DIAGNOSIS — Z01.818 PREOPERATIVE TESTING: ICD-10-CM

## 2024-07-10 NOTE — TELEPHONE ENCOUNTER
We are rescheduling patient's surgery for RT CTR/Rt Thumb TFR that was scheduled on 7/15/2024. We received PCP Clearance response from Apoorva Howard stating that patient has a A1C of 11.6 and she would like it at 9 before proceeding with surgery. I have informed Dr. Toledo and he has instructed me to move her surgery out 6 weeks, have her repeat her A1C in 4 weeks. I have notified patient and she agrees to instructions and new date. Ashlyn notified of new date. Post op appt changed to reflect new date.    Surgery now scheduled for 8/26/2024

## 2024-07-12 LAB
C TRACH RRNA SPEC QL NAA+PROBE: NEGATIVE
N GONORRHOEA RRNA SPEC QL NAA+PROBE: NEGATIVE

## 2024-07-13 LAB — HPV HIGH RISK: DETECTED

## 2024-07-15 LAB
HPV GENOTYPE 18,45: NOT DETECTED
HPV SOURCE: NORMAL
HPV, GENOTYPE 16: NOT DETECTED

## 2024-07-18 LAB
Lab: NEGATIVE
Lab: NORMAL

## 2024-08-13 ENCOUNTER — TELEPHONE (OUTPATIENT)
Dept: ORTHOPEDIC SURGERY | Age: 49
End: 2024-08-13

## 2024-08-13 NOTE — TELEPHONE ENCOUNTER
Patient called requesting Lab order for A1C be send to [facility]. Order faxed to [phone]

## 2024-08-15 NOTE — PROGRESS NOTES
Patient will arrive at 0600 at [facility] on 8/26/2024 for her surgery at 0730.Patient will have labs drawn at [facility], she stated that \" the office faxed lab orders to [facility]\"    NOTHING TO EAT OR DRINK AFTER MIDNIGHT DAY OF SURGERY    1. Enter thru the hospital main entrance on day of surgery, check in at the Information Desk. If you arrive prior to 6:00am, enter thru the ER entrance.    2. Follow the directions as prescribed by the doctor for your procedure and medications.         Morning of surgery take:coreg, lexapro, gabapentin and prilosec.         Stop vitamins, supplements and NSAIDS: jardiance last dose 8/22/2024.    3. Check with your Doctor regarding stopping blood thinners and follow their instructions.    4. Do not smoke, vape or use chewing tobacco morning of surgery. Do not drink any alcoholic beverages 24 hours prior to surgery.       This includes NA Beer. No street drugs 7 days prior to surgery.    5. If you have dentures, contacts of glasses they will be removed before going to the OR; please bring a case.    6. Please bring picture ID, insurance card, paperwork from the doctor’s office (H & P, Consent, & card for implantable devices).    7. Take a shower with an antibacterial soap the night before surgery and the morning of surgery. Do not put anything on your skin      After your morning shower.    8. You will need a responsible adult to drive you home and check on you after surgery.

## 2024-08-20 LAB — HBA1C MFR BLD: 10.3 %

## 2024-08-23 ENCOUNTER — ANESTHESIA EVENT (OUTPATIENT)
Dept: OPERATING ROOM | Age: 49
End: 2024-08-23
Payer: MEDICARE

## 2024-08-23 NOTE — ANESTHESIA PRE PROCEDURE
Department of Anesthesiology  Preprocedure Note       Name:  Janessa Elise   Age:  48 y.o.  :  1975                                          MRN:  [id number]         Date:  2024      Surgeon: Surgeon(s):  Osmar Toledo MD    Procedure: Procedure(s):  RIGHT CARPAL TUNNEL RELEASE  RIGHT FINGER TRIGGER RELEASE/TENOSYNOVECTOMY    Medications prior to admission:   Prior to Admission medications    Medication Sig Start Date End Date Taking? Authorizing Provider   ondansetron (ZOFRAN) 4 MG tablet TAKE 1 TABLET BY MOUTH 4 TIMES DAILY AS NEEDED 5/3/24   Fabio Saldana MD   desvenlafaxine succinate (PRISTIQ) 100 MG TB24 extended release tablet Take 1 tablet by mouth daily 24   Fabio Saldana MD   gabapentin (NEURONTIN) 400 MG capsule TAKE 1 CAPSULE BY MOUTH THREE TIMES DAILY AS NEEDED    Fabio Saldana MD   JARDIANCE 25 MG tablet TAKE 1 TABLET BY MOUTH ONCE DAILY FOR 90 DAYS    Fabio Saldana MD   OXcarbazepine (TRILEPTAL) 300 MG tablet     Fabio Saldana MD   oxyBUTYnin (DITROPAN-XL) 10 MG extended release tablet Take by mouth daily    Fabio Saldana MD   fenofibrate micronized (LOFIBRA) 200 MG capsule TAKE 1 CAPSULE BY MOUTH ONCE DAILY FOR 30 DAYS    Fabio Saldana MD   escitalopram (LEXAPRO) 10 MG tablet     Fabio Saldana MD   omeprazole (PRILOSEC) 40 MG delayed release capsule Take 1 capsule by mouth daily    Fabio Saldana MD   hydrOXYzine pamoate (VISTARIL) 50 MG capsule Take 2 capsules by mouth 3 times daily as needed    Fabio Saldana MD   atorvastatin (LIPITOR) 20 MG tablet Take 2 tablets by mouth daily    Fabio Saldana MD   mirtazapine (REMERON) 15 MG tablet TAKE 1 TABLET BY MOUTH ONCE DAILY AT BEDTIME FOR 90 DAYS    Fabio Saldana MD   carvedilol (COREG) 6.25 MG tablet Take 1 tablet by mouth 2 times daily    Fabio Saldana MD   prazosin (MINIPRESS) 1 MG capsule Take 1 capsule by mouth nightly     escitalopram (LEXAPRO) 10 MG tablet      • omeprazole (PRILOSEC) 40 MG delayed release capsule Take 1 capsule by mouth daily     • hydrOXYzine pamoate (VISTARIL) 50 MG capsule Take 2 capsules by mouth 3 times daily as needed     • atorvastatin (LIPITOR) 20 MG tablet Take 2 tablets by mouth daily     • mirtazapine (REMERON) 15 MG tablet TAKE 1 TABLET BY MOUTH ONCE DAILY AT BEDTIME FOR 90 DAYS     • carvedilol (COREG) 6.25 MG tablet Take 1 tablet by mouth 2 times daily     • prazosin (MINIPRESS) 1 MG capsule Take 1 capsule by mouth nightly     • glipiZIDE (GLUCOTROL XL) 10 MG extended release tablet TAKE 1 TABLET BY MOUTH ONCE DAILY IN THE MORNING FOR 90 DAYS     • potassium citrate (UROCIT-K) 10 MEQ (1080 MG) extended release tablet Take 1 tablet by mouth 2 times daily     • furosemide (LASIX) 20 MG tablet TAKE 1 TABLET BY MOUTH TWICE DAILY (8AM & 5PM)     • HUMULIN R U-500 KWIKPEN 500 UNIT/ML SOPN concentrated injection pen INJECT 120 UNITS SUBCUTANEOUSLY BEFORE BREAKFAST AND INJECT 150 UNITS AT DINNER     • SOLIQUA 100-33 UNT-MCG/ML SOPN 32 Units daily (with breakfast)     • clonazePAM (KLONOPIN) 1 MG tablet      • dicyclomine (BENTYL) 20 MG tablet Take 1 tablet by mouth every 6 hours     • ipratropium 0.5 mg-albuterol 2.5 mg (DUONEB) 0.5-2.5 (3) MG/3ML SOLN nebulizer solution Inhale 3 mLs into the lungs     • SUMAtriptan (IMITREX) 100 MG tablet TAKE 1 TABLET BY MOUTH AT THE ONSET OF HEADACHE, MAY REPEAT IN 2 HOURS IF NEEDED. MAX DAILY AMOUNT IS 3 TABLETS         Allergies:    Allergies   Allergen Reactions   • Haldol [Haloperidol] Anaphylaxis     Throat closing - SOB   • Betadine [Povidone Iodine]      Blisters on skin.       Problem List:    Patient Active Problem List   Diagnosis Code   • Right carpal tunnel syndrome G56.01   • Trigger thumb of right hand M65.311   • Trigger thumb of left hand M65.312   • Left carpal tunnel syndrome G56.02   • Carpal tunnel syndrome, right G56.01       Past Medical History:

## 2024-08-23 NOTE — PROGRESS NOTES
8/23/24 - .Notified patient surgery @ [facility] on  8/26/24 @ 1000, arrival 0800. NPO status and morning medications reviewed. Understanding verbalized.

## 2024-08-26 ENCOUNTER — HOSPITAL ENCOUNTER (OUTPATIENT)
Age: 49
Setting detail: OUTPATIENT SURGERY
Discharge: HOME | End: 2024-08-26
Attending: ORTHOPAEDIC SURGERY | Admitting: ORTHOPAEDIC SURGERY
Payer: MEDICARE

## 2024-08-26 ENCOUNTER — ANESTHESIA (OUTPATIENT)
Dept: OPERATING ROOM | Age: 49
End: 2024-08-26
Payer: MEDICARE

## 2024-08-26 VITALS
RESPIRATION RATE: 16 BRPM | DIASTOLIC BLOOD PRESSURE: 67 MMHG | WEIGHT: 270 LBS | TEMPERATURE: 97.4 F | OXYGEN SATURATION: 95 % | SYSTOLIC BLOOD PRESSURE: 107 MMHG | HEIGHT: 67 IN | HEART RATE: 87 BPM | BODY MASS INDEX: 42.38 KG/M2

## 2024-08-26 DIAGNOSIS — G56.01 CARPAL TUNNEL SYNDROME, RIGHT: Primary | ICD-10-CM

## 2024-08-26 LAB
GLUCOSE BLD-MCNC: 362 MG/DL (ref 70–99)
PREGNANCY TEST URINE, POC: NEGATIVE

## 2024-08-26 PROCEDURE — 26055 INCISE FINGER TENDON SHEATH: CPT | Performed by: ORTHOPAEDIC SURGERY

## 2024-08-26 PROCEDURE — 3600000002 HC SURGERY LEVEL 2 BASE: Performed by: ORTHOPAEDIC SURGERY

## 2024-08-26 PROCEDURE — 2500000003 HC RX 250 WO HCPCS: Performed by: ORTHOPAEDIC SURGERY

## 2024-08-26 PROCEDURE — 3700000000 HC ANESTHESIA ATTENDED CARE: Performed by: ORTHOPAEDIC SURGERY

## 2024-08-26 PROCEDURE — 7100000010 HC PHASE II RECOVERY - FIRST 15 MIN: Performed by: ORTHOPAEDIC SURGERY

## 2024-08-26 PROCEDURE — 6360000002 HC RX W HCPCS: Performed by: ORTHOPAEDIC SURGERY

## 2024-08-26 PROCEDURE — 2709999900 HC NON-CHARGEABLE SUPPLY: Performed by: ORTHOPAEDIC SURGERY

## 2024-08-26 PROCEDURE — 81025 URINE PREGNANCY TEST: CPT

## 2024-08-26 PROCEDURE — 2580000003 HC RX 258: Performed by: ANESTHESIOLOGY

## 2024-08-26 PROCEDURE — 7100000011 HC PHASE II RECOVERY - ADDTL 15 MIN: Performed by: ORTHOPAEDIC SURGERY

## 2024-08-26 PROCEDURE — 3600000012 HC SURGERY LEVEL 2 ADDTL 15MIN: Performed by: ORTHOPAEDIC SURGERY

## 2024-08-26 PROCEDURE — 6360000002 HC RX W HCPCS: Performed by: NURSE ANESTHETIST, CERTIFIED REGISTERED

## 2024-08-26 PROCEDURE — 64721 CARPAL TUNNEL SURGERY: CPT | Performed by: ORTHOPAEDIC SURGERY

## 2024-08-26 PROCEDURE — 3700000001 HC ADD 15 MINUTES (ANESTHESIA): Performed by: ORTHOPAEDIC SURGERY

## 2024-08-26 PROCEDURE — 82962 GLUCOSE BLOOD TEST: CPT

## 2024-08-26 PROCEDURE — 2580000003 HC RX 258: Performed by: ORTHOPAEDIC SURGERY

## 2024-08-26 RX ORDER — SODIUM CHLORIDE 0.9 % (FLUSH) 0.9 %
5-40 SYRINGE (ML) INJECTION EVERY 12 HOURS SCHEDULED
Status: CANCELLED | OUTPATIENT
Start: 2024-08-26

## 2024-08-26 RX ORDER — ONDANSETRON 2 MG/ML
4 INJECTION INTRAMUSCULAR; INTRAVENOUS
Status: CANCELLED | OUTPATIENT
Start: 2024-08-26 | End: 2024-08-27

## 2024-08-26 RX ORDER — PROPOFOL 10 MG/ML
INJECTION, EMULSION INTRAVENOUS CONTINUOUS PRN
Status: DISCONTINUED | OUTPATIENT
Start: 2024-08-26 | End: 2024-08-26 | Stop reason: SDUPTHER

## 2024-08-26 RX ORDER — MIDAZOLAM HYDROCHLORIDE 1 MG/ML
INJECTION INTRAMUSCULAR; INTRAVENOUS PRN
Status: DISCONTINUED | OUTPATIENT
Start: 2024-08-26 | End: 2024-08-26 | Stop reason: SDUPTHER

## 2024-08-26 RX ORDER — ACETAMINOPHEN 325 MG/1
650 TABLET ORAL
Status: CANCELLED | OUTPATIENT
Start: 2024-08-26 | End: 2024-08-27

## 2024-08-26 RX ORDER — PROCHLORPERAZINE EDISYLATE 5 MG/ML
5 INJECTION INTRAMUSCULAR; INTRAVENOUS
Status: CANCELLED | OUTPATIENT
Start: 2024-08-26 | End: 2024-08-27

## 2024-08-26 RX ORDER — SODIUM CHLORIDE 9 MG/ML
INJECTION, SOLUTION INTRAVENOUS PRN
Status: CANCELLED | OUTPATIENT
Start: 2024-08-26

## 2024-08-26 RX ORDER — SODIUM CHLORIDE 0.9 % (FLUSH) 0.9 %
5-40 SYRINGE (ML) INJECTION EVERY 12 HOURS SCHEDULED
Status: DISCONTINUED | OUTPATIENT
Start: 2024-08-26 | End: 2024-08-29 | Stop reason: HOSPADM

## 2024-08-26 RX ORDER — SODIUM CHLORIDE 0.9 % (FLUSH) 0.9 %
5-40 SYRINGE (ML) INJECTION PRN
Status: DISCONTINUED | OUTPATIENT
Start: 2024-08-26 | End: 2024-08-29 | Stop reason: HOSPADM

## 2024-08-26 RX ORDER — NALOXONE HYDROCHLORIDE 0.4 MG/ML
INJECTION, SOLUTION INTRAMUSCULAR; INTRAVENOUS; SUBCUTANEOUS PRN
Status: CANCELLED | OUTPATIENT
Start: 2024-08-26

## 2024-08-26 RX ORDER — FENTANYL CITRATE 50 UG/ML
INJECTION, SOLUTION INTRAMUSCULAR; INTRAVENOUS PRN
Status: DISCONTINUED | OUTPATIENT
Start: 2024-08-26 | End: 2024-08-26 | Stop reason: SDUPTHER

## 2024-08-26 RX ORDER — SODIUM CHLORIDE 0.9 % (FLUSH) 0.9 %
5-40 SYRINGE (ML) INJECTION PRN
Status: CANCELLED | OUTPATIENT
Start: 2024-08-26

## 2024-08-26 RX ORDER — SODIUM CHLORIDE, SODIUM LACTATE, POTASSIUM CHLORIDE, CALCIUM CHLORIDE 600; 310; 30; 20 MG/100ML; MG/100ML; MG/100ML; MG/100ML
INJECTION, SOLUTION INTRAVENOUS CONTINUOUS
Status: DISCONTINUED | OUTPATIENT
Start: 2024-08-26 | End: 2024-08-29 | Stop reason: HOSPADM

## 2024-08-26 RX ORDER — HYDROCODONE BITARTRATE AND ACETAMINOPHEN 5; 325 MG/1; MG/1
1 TABLET ORAL EVERY 6 HOURS PRN
Qty: 12 TABLET | Refills: 0 | Status: SHIPPED | OUTPATIENT
Start: 2024-08-26 | End: 2024-08-29

## 2024-08-26 RX ORDER — SODIUM CHLORIDE 9 MG/ML
INJECTION, SOLUTION INTRAVENOUS PRN
Status: DISCONTINUED | OUTPATIENT
Start: 2024-08-26 | End: 2024-08-29 | Stop reason: HOSPADM

## 2024-08-26 RX ADMIN — FENTANYL CITRATE 25 MCG: 50 INJECTION, SOLUTION INTRAMUSCULAR; INTRAVENOUS at 11:19

## 2024-08-26 RX ADMIN — CEFAZOLIN 2000 MG: 2 INJECTION, POWDER, FOR SOLUTION INTRAMUSCULAR; INTRAVENOUS at 11:15

## 2024-08-26 RX ADMIN — PROPOFOL 100 MCG/KG/MIN: 10 INJECTION, EMULSION INTRAVENOUS at 11:11

## 2024-08-26 RX ADMIN — SODIUM CHLORIDE, POTASSIUM CHLORIDE, SODIUM LACTATE AND CALCIUM CHLORIDE: 600; 310; 30; 20 INJECTION, SOLUTION INTRAVENOUS at 08:54

## 2024-08-26 RX ADMIN — MIDAZOLAM 2 MG: 1 INJECTION INTRAMUSCULAR; INTRAVENOUS at 11:04

## 2024-08-26 RX ADMIN — FENTANYL CITRATE 50 MCG: 50 INJECTION, SOLUTION INTRAMUSCULAR; INTRAVENOUS at 11:06

## 2024-08-26 RX ADMIN — FENTANYL CITRATE 25 MCG: 50 INJECTION, SOLUTION INTRAMUSCULAR; INTRAVENOUS at 11:16

## 2024-08-26 ASSESSMENT — PAIN - FUNCTIONAL ASSESSMENT
PAIN_FUNCTIONAL_ASSESSMENT: 0-10
PAIN_FUNCTIONAL_ASSESSMENT: 0-10
PAIN_FUNCTIONAL_ASSESSMENT: PREVENTS OR INTERFERES SOME ACTIVE ACTIVITIES AND ADLS

## 2024-08-26 ASSESSMENT — PAIN DESCRIPTION - PAIN TYPE: TYPE: CHRONIC PAIN

## 2024-08-26 ASSESSMENT — PAIN DESCRIPTION - LOCATION: LOCATION: HAND

## 2024-08-26 ASSESSMENT — PAIN DESCRIPTION - ONSET: ONSET: ON-GOING

## 2024-08-26 ASSESSMENT — PAIN DESCRIPTION - FREQUENCY: FREQUENCY: CONTINUOUS

## 2024-08-26 ASSESSMENT — PAIN DESCRIPTION - DESCRIPTORS: DESCRIPTORS: ACHING;DISCOMFORT

## 2024-08-26 ASSESSMENT — PAIN SCALES - GENERAL: PAINLEVEL_OUTOF10: 8

## 2024-08-26 ASSESSMENT — PAIN DESCRIPTION - ORIENTATION: ORIENTATION: RIGHT

## 2024-08-26 NOTE — OP NOTE
Operative Note      Patient: Janessa Elise  YOB: 1975  MRN: [id number]    Date of Procedure: 8/26/2024    Pre-Op Diagnosis Codes:      * Carpal tunnel syndrome, right [G56.01]     * Trigger thumb of right hand [M65.311]    Post-Op Diagnosis: Same       Procedure(s):  RIGHT CARPAL TUNNEL RELEASE  RIGHT THUMB TRIGGER FINGER RELEASE/TENOSYNOVECTOMY    Surgeon(s):  Osmar Toledo MD    Assistant:   Physician Assistant: Phil Godoy PA-C Greg Mann was present for the entirety of the procedure and vital for the performance of the procedure. Grady Godoy assisted with positioning, prepping, draping of the patient before the procedure and instrument manipulation, extremity repositioning during the procedure as well as wound closure, dressing application and brace application after the procedure. Please note that no intern, resident, or other hospital staff was available to assist during the surgery    Anesthesia: Monitor Anesthesia Care    Estimated Blood Loss (mL): Minimal    Complications: None    Specimens:   * No specimens in log *    Implants:  * No implants in log *      Drains: * No LDAs found *    Findings:  Infection Present At Time Of Surgery (PATOS) (choose all levels that have infection present):  No infection present  Other Findings:     Detailed Description of Procedure:     DESCRIPTION OF PROCEDURE:  The patient was identified in the  preoperative area and the site was marked.  The patient was taken back to the  operating room and a hand table was attached to the cart.  The right  upper extremity was prepped with alcohol and then the surgical site was  injected with a mixture of 5 mL of 1% plain lidocaine and 5 mL of 0.5%  Marcaine with epinephrine.  The right upper extremity was then prepped  and draped in sterile fashion with an arm tourniquet.  Timeout was  performed.  Preoperative antibiotics were given.  The arm was  exsanguinated with an Esmarch and tourniquet inflated to 250 mmHg  and  deflated at the conclusion of the case after less than 10 minutes of  tourniquet time.     An incision was made overlying the carpal tunnel along the mid aspect  and dissection was carried down through subcutaneous tissues and palmar  fascia was identified and divided and retracted.  This exposed the transverse  carpal ligament.  The ligament was divided under direct visualization  with a 15-blade and the release was carried out distally to the level of  the palmar fat and then proximally into the distal forearm fascia.  The  two ends of the transverse carpal ligament retracted nicely  revealing the healthy-appearing median nerve, which was protected  throughout the case.  The nerve was visually inspected and found to be decompressed nicely at  the conclusion of the case.  The wound was thoroughly irrigated and  tourniquet was deflated.  Bleeding was controlled with a bipolar device as needed.    Next attention was taken to the thumb.  A transverse incision was made at the flexor crease overlying the CP joint of the thumb.  Dissection was carried down to the subcutaneous tissues and bleeding was well-controlled.  The radial digital nerve was identified and retracted and protected throughout the case.  Soft tissue was dissected off the volar aspect of the flexor tendon sheath and A1 pulley.  With retractors in place, the A1 pulley was incised along its mid aspect sharply with a 15 blade.  The flexor tendon was then delivered into the wound and freed of adhesions.  The thumbs taken through full range of motion there was excellent motion across the IP and MP joints of the thumb in appropriate movements and gliding of the flexor tendon with no evidence of further impingement.  The tendon appeared healthy with only mired mild fraying from impingement at the A1 pulley.      Skin edges were re-approximated with interrupted 4-0 nylon sutures.   Sterile dressing was applied with Xeroform, 4 x 8's, sterile Webril,

## 2024-08-26 NOTE — DISCHARGE INSTRUCTIONS
Carpal Tunnel Release  Discharge Instructions    Keep the bandage and ace wrap clean and dry for 2 days.  Remove the Ace wrap and all dressings 2 days after surgery.  It is okay to gently clean the hand with soap and water.  OK to gently clean the incision/stitches with moist gauze. Place a large band-aid or gauze bandage over the stitches and replace daily as needed.  It is okay to leave the incision open to air once there is no further drainage from the wound, but avoid dirt/contamination.    Reuse the ace wrap for comfort/protection as needed.  You may reuse the splint with Ace wrap as needed    Move fingers regularly to prevent stiffness.    Use ice 20 minutes at a time every 1-2 hours as needed for pain relief.    It is okay to use the hand for light activities such as eating and getting dressed.  No heavy gripping or direct pressure on the healing incision.           [facility]  [phone]    Do not drive, work around machines or use equipment.  Do not drink any alcoholic beverages.  Do not smoke while alone.  Avoid making important decisions.  Plan to spend a quiet, relaxed evening @ home.  Resume normal activities as you begin to feel better.  Eat lightly for your first meal, then gradually increase your diet to what is normal for you.  In case of nausea, avoid food and drink only clear liquids.  Resume food as nausea ceases.  Notify your surgeon if you experience fever, chills, large amount of bleeding, difficulty breathing, persistent nausea and vomiting or any other disturbing problem.  Call for a follow-up appointment with your surgeon.

## 2024-08-26 NOTE — ANESTHESIA POSTPROCEDURE EVALUATION
Department of Anesthesiology  Postprocedure Note    Patient: Janessa Elise  MRN: [id number]  YOB: 1975  Date of evaluation: 8/26/2024    Procedure Summary       Date: 08/26/24 Room / Location: [address]    Anesthesia Start: 1104 Anesthesia Stop: 1206    Procedures:       RIGHT CARPAL TUNNEL RELEASE (Right)      RIGHT THUMB TRIGGER FINGER RELEASE/TENOSYNOVECTOMY (Right) Diagnosis:       Carpal tunnel syndrome, right      Trigger thumb of right hand      (Carpal tunnel syndrome, right [G56.01])      (Trigger thumb of right hand [M65.311])    Surgeons: Osmar Toledo MD Responsible Provider: Charanjit Sabillon MD    Anesthesia Type: MAC ASA Status: 3            Anesthesia Type: No value filed.    Joceline Phase I: Joceline Score: 10    Joceline Phase II: Joceline Score: 10    Anesthesia Post Evaluation    Patient location during evaluation: bedside  Patient participation: complete - patient participated  Level of consciousness: awake and alert  Pain score: 0  Airway patency: patent  Nausea & Vomiting: no nausea and no vomiting  Cardiovascular status: blood pressure returned to baseline and hemodynamically stable  Respiratory status: acceptable, room air and spontaneous ventilation  Hydration status: stable  Pain management: adequate and satisfactory to patient    No notable events documented.

## 2024-08-26 NOTE — H&P
Chief Complaint   Patient presents with    Wrist Pain       6 wk post op left wrist CTR and Lt thumb TFR   Worsening right hand pain numbness and tingling and triggering of the thumb     History of Present Illness:                             Janessa Elise is a 48 y.o. female who returns today for follow-up of her left hand following previous carpal tunnel release and trigger thumb release as well as evaluation of her worsening symptoms now on the right side.     She has noticed good improvement with her left hand and is very pleased with her healing process.  She has no triggering at the thumb and her sensation is improved in the hand.  She has noticed worsening symptoms now on her right side and feels that her right side is more severe than the left.\            She would like to discuss moving forward now with surgery on her more symptomatic right side     Patient seen in office today for:6 wk post op Lt CTR/ Lt thumb TFR  ROM is pretty good sometimes pain radiates to her shoulder or vs she's not sure   She is doing very well      DOS:5/30/24        Patient reports 0/10 pain on the left, 6 out of 10 pain on the right.  RICE and medication are effective to alleviate pain and reduce swelling.   Pain also alleviated by: OTC med at times   Pain worsened by: Patient reports painful ROM & weight bearing.         Patient interested in speaking to provider about surgical option.for her RT to be done also Possible trigger starting would like to get surgery over with      Profession: disabled     Right handed             Medical History  Patient's medications, allergies, past medical, surgical, social and family histories were reviewed and updated as appropriate.     Past Medical History        Past Medical History:   Diagnosis Date    Asthma      Bipolar 1 disorder (HCC)      Depression with anxiety      Diabetes mellitus (HCC)      GERD (gastroesophageal reflux disease)      Hyperlipidemia      Hypertension      Kidney

## 2024-08-26 NOTE — PROGRESS NOTES
1200  Pt back to Same day direct from OR.  Pt is drowsy but responds appropriately.  Skin pink, W&D. Has bulky drsg with ace wrap intact to Rt wrist with no signs of drainage. Distal Rt fingers are warm and pink. Pt denies any pain or nausea. Report received from Angelito GILES and Deepika MALAGON. Sig other at bedside.  Pt given soda and crackers.   1219 Pt states is ready to go home. VS rechecked and stable. No nausea after eating and drinking.   1223 Pt and sig other instructed for discharge care and follow-up and use of Rx with understanding voiced. Rt fingers remain warm and pink.  IV dc'd and pt up in room to get dressed. Advised to move slowly initially.  1230 Pt to car at exit per wheelchair by volunteer.

## 2024-09-05 ENCOUNTER — OFFICE VISIT (OUTPATIENT)
Dept: ORTHOPEDIC SURGERY | Age: 49
End: 2024-09-05

## 2024-09-05 VITALS — HEART RATE: 91 BPM | OXYGEN SATURATION: 96 %

## 2024-09-05 DIAGNOSIS — Z98.890 STATUS POST CARPAL TUNNEL RELEASE: Primary | ICD-10-CM

## 2024-10-03 ENCOUNTER — OFFICE VISIT (OUTPATIENT)
Dept: ORTHOPEDIC SURGERY | Age: 49
End: 2024-10-03

## 2024-10-03 VITALS — BODY MASS INDEX: 44.57 KG/M2 | HEIGHT: 67 IN | WEIGHT: 284 LBS

## 2024-10-03 DIAGNOSIS — Z98.890 STATUS POST CARPAL TUNNEL RELEASE: Primary | ICD-10-CM

## 2024-10-03 PROCEDURE — 99024 POSTOP FOLLOW-UP VISIT: CPT

## 2024-10-03 NOTE — PATIENT INSTRUCTIONS
Continue weight-bearing as tolerated.  Continue range of motion exercises as instructed.  Ice and elevate as needed.  Tylenol or Motrin for pain.   Occupational therapy ordered today  Follow up in 6 weeks

## 2025-08-15 ENCOUNTER — HOSPITAL ENCOUNTER (OUTPATIENT)
Dept: SLEEP CENTER | Age: 50
Discharge: HOME | End: 2025-08-15
Payer: MEDICARE

## 2025-08-15 VITALS
WEIGHT: 270 LBS | SYSTOLIC BLOOD PRESSURE: 165 MMHG | DIASTOLIC BLOOD PRESSURE: 75 MMHG | HEART RATE: 99 BPM | OXYGEN SATURATION: 91 % | BODY MASS INDEX: 42.38 KG/M2 | HEIGHT: 67 IN

## 2025-08-15 DIAGNOSIS — R06.83 SNORING: ICD-10-CM

## 2025-08-15 DIAGNOSIS — G47.10 HYPERSOMNOLENCE: Primary | ICD-10-CM

## 2025-08-15 PROCEDURE — 99211 OFF/OP EST MAY X REQ PHY/QHP: CPT

## 2025-08-15 RX ORDER — ERGOCALCIFEROL 1.25 MG/1
50000 CAPSULE, LIQUID FILLED ORAL WEEKLY
COMMUNITY

## 2025-08-15 RX ORDER — TIRZEPATIDE 7.5 MG/.5ML
INJECTION, SOLUTION SUBCUTANEOUS WEEKLY
COMMUNITY

## 2025-08-15 RX ORDER — LANOLIN ALCOHOL/MO/W.PET/CERES
400 CREAM (GRAM) TOPICAL DAILY
COMMUNITY

## 2025-08-15 ASSESSMENT — SLEEP AND FATIGUE QUESTIONNAIRES
HOW LIKELY ARE YOU TO NOD OFF OR FALL ASLEEP WHILE SITTING AND TALKING TO SOMEONE: WOULD NEVER DOZE
HOW LIKELY ARE YOU TO NOD OFF OR FALL ASLEEP WHEN YOU ARE A PASSENGER IN A CAR FOR AN HOUR WITHOUT A BREAK: HIGH CHANCE OF DOZING
HOW LIKELY ARE YOU TO NOD OFF OR FALL ASLEEP IN A CAR, WHILE STOPPED FOR A FEW MINUTES IN TRAFFIC: WOULD NEVER DOZE
HOW LIKELY ARE YOU TO NOD OFF OR FALL ASLEEP WHILE SITTING QUIETLY AFTER LUNCH WITHOUT ALCOHOL: HIGH CHANCE OF DOZING
HOW LIKELY ARE YOU TO NOD OFF OR FALL ASLEEP WHILE SITTING AND READING: HIGH CHANCE OF DOZING
HOW LIKELY ARE YOU TO NOD OFF OR FALL ASLEEP WHILE WATCHING TV: HIGH CHANCE OF DOZING
HOW LIKELY ARE YOU TO NOD OFF OR FALL ASLEEP WHILE SITTING INACTIVE IN A PUBLIC PLACE: SLIGHT CHANCE OF DOZING
HOW LIKELY ARE YOU TO NOD OFF OR FALL ASLEEP WHILE LYING DOWN TO REST IN THE AFTERNOON WHEN CIRCUMSTANCES PERMIT: HIGH CHANCE OF DOZING
ESS TOTAL SCORE: 16

## (undated) DEVICE — GLOVE SURG SZ 8 CRM LTX FREE POLYISOPRENE POLYMER BEAD ANTI

## (undated) DEVICE — SHEET,DRAPE,53X77,STERILE: Brand: MEDLINE

## (undated) DEVICE — ELECTRODE ES AD CRDLSS PT RET REM POLYHESIVE

## (undated) DEVICE — YANKAUER,FLEXIBLE HANDLE,REGLR CAPACITY: Brand: MEDLINE INDUSTRIES, INC.

## (undated) DEVICE — TOWEL,OR,DSP,ST,BLUE,STD,6/PK,12PK/CS: Brand: MEDLINE

## (undated) DEVICE — INTENDED FOR TISSUE SEPARATION, AND OTHER PROCEDURES THAT REQUIRE A SHARP SURGICAL BLADE TO PUNCTURE OR CUT.: Brand: BARD-PARKER ® STAINLESS STEEL BLADES

## (undated) DEVICE — SPLINT ORTH SINGLE SIDE 12X3 IN PRECUT PADDED FIBERGLASS LF

## (undated) DEVICE — SPONGE GZ W4XL8IN COT WVN 12 PLY

## (undated) DEVICE — GAUZE,SPONGE,4"X4",16PLY,XRAY,STRL,LF: Brand: MEDLINE

## (undated) DEVICE — GLOVE ORANGE PI 8   MSG9080

## (undated) DEVICE — GOWN,SIRUS,POLYRNF,BRTHSLV,XLN/XL,20/CS: Brand: MEDLINE

## (undated) DEVICE — GLOVE SURG SZ 75 CRM LTX FREE POLYISOPRENE POLYMER BEAD ANTI

## (undated) DEVICE — ZIMMER® STERILE DISPOSABLE TOURNIQUET CUFF WITH PLC, DUAL PORT, SINGLE BLADDER, 18 IN. (46 CM)

## (undated) DEVICE — SUTURE NONABSORBABLE MONOFILAMENT 4-0 FS-2 18 IN ETHILON 662H

## (undated) DEVICE — PACK,BASIC,IX: Brand: MEDLINE

## (undated) DEVICE — DRAPE SURGICAL HAND PROX AURORA

## (undated) DEVICE — GLOVE ORANGE PI 7 1/2   MSG9075

## (undated) DEVICE — LINER,SEMI-RIGID,3000CC,50EA/CS: Brand: MEDLINE

## (undated) DEVICE — DRESSING,GAUZE,XEROFORM,CURAD,1"X8",ST: Brand: CURAD

## (undated) DEVICE — BANDAGE,ELASTIC,ESMARK,STERILE,4"X9',LF: Brand: MEDLINE

## (undated) DEVICE — NEEDLE HYPO 25GA L1.5IN BLU POLYPR HUB S STL REG BVL STR

## (undated) DEVICE — PADDING CAST 2INW X 4YDL COTTON RAYON ROLYAN LATEX FREE

## (undated) DEVICE — THREE QUARTER SHEET: Brand: CONVERTORS

## (undated) DEVICE — SUTURE ETHILON SZ 4-0 L18IN NONABSORBABLE BLK L13MM P-3 3/8 699G

## (undated) DEVICE — BANDAGE COMPR W2INXL5.5YD BGE POLY COT BLEND YARN HNY STRP

## (undated) DEVICE — SYRINGE MED LEURLOK 20 CC

## (undated) DEVICE — MARKER SURG SKIN UTIL REGULAR/FINE 2 TIP W/ RUL AND 9 LBL

## (undated) DEVICE — COUNTER NDL 30 COUNT FOAM STRP SGL MAG

## (undated) DEVICE — APPLICATOR MEDICATED 26 CC SOLUTION HI LT ORNG CHLORAPREP

## (undated) DEVICE — BANDAGE COMPR 2IN X5YD SELF ADH RED N STRLE CO FLX

## (undated) DEVICE — CORD,CAUTERY,BIPOLAR,STERILE: Brand: MEDLINE

## (undated) DEVICE — SYRINGE MED 10ML LUERLOCK TIP W/O SFTY DISP

## (undated) DEVICE — TUBING, SUCTION, 9/32" X 10', STRAIGHT: Brand: MEDLINE

## (undated) DEVICE — BANDAGE GZ W2XL75IN ST RAYON POLY CNFRM STRTCH LTWT